# Patient Record
Sex: FEMALE | Race: BLACK OR AFRICAN AMERICAN | NOT HISPANIC OR LATINO
[De-identification: names, ages, dates, MRNs, and addresses within clinical notes are randomized per-mention and may not be internally consistent; named-entity substitution may affect disease eponyms.]

---

## 2018-03-09 ENCOUNTER — APPOINTMENT (OUTPATIENT)
Dept: INTERVENTIONAL RADIOLOGY/VASCULAR | Facility: HOSPITAL | Age: 48
End: 2018-03-09
Payer: COMMERCIAL

## 2018-03-09 DIAGNOSIS — D25.9 LEIOMYOMA OF UTERUS, UNSPECIFIED: ICD-10-CM

## 2018-03-09 DIAGNOSIS — I10 ESSENTIAL (PRIMARY) HYPERTENSION: ICD-10-CM

## 2018-03-09 DIAGNOSIS — Z78.9 OTHER SPECIFIED HEALTH STATUS: ICD-10-CM

## 2018-03-09 DIAGNOSIS — Z86.79 PERSONAL HISTORY OF OTHER DISEASES OF THE CIRCULATORY SYSTEM: ICD-10-CM

## 2018-03-09 DIAGNOSIS — J45.40 MODERATE PERSISTENT ASTHMA, UNCOMPLICATED: ICD-10-CM

## 2018-03-09 PROCEDURE — 99205 OFFICE O/P NEW HI 60 MIN: CPT

## 2018-03-09 RX ORDER — ALBUTEROL SULFATE 90 UG/1
108 (90 BASE) AEROSOL, METERED RESPIRATORY (INHALATION)
Refills: 0 | Status: ACTIVE | COMMUNITY

## 2018-03-09 RX ORDER — METFORMIN HYDROCHLORIDE 1000 MG/1
1000 TABLET, EXTENDED RELEASE ORAL
Refills: 0 | Status: ACTIVE | COMMUNITY

## 2018-03-09 RX ORDER — GABAPENTIN 300 MG/1
300 CAPSULE ORAL 3 TIMES DAILY
Refills: 0 | Status: ACTIVE | COMMUNITY

## 2018-03-23 ENCOUNTER — OUTPATIENT (OUTPATIENT)
Dept: OUTPATIENT SERVICES | Facility: HOSPITAL | Age: 48
LOS: 1 days | End: 2018-03-23
Payer: MEDICAID

## 2018-03-23 DIAGNOSIS — Z01.818 ENCOUNTER FOR OTHER PREPROCEDURAL EXAMINATION: ICD-10-CM

## 2018-03-23 DIAGNOSIS — D25.9 LEIOMYOMA OF UTERUS, UNSPECIFIED: ICD-10-CM

## 2018-03-23 LAB
ALBUMIN SERPL ELPH-MCNC: 4.1 G/DL — SIGNIFICANT CHANGE UP (ref 3.3–5)
ALP SERPL-CCNC: 76 U/L — SIGNIFICANT CHANGE UP (ref 40–120)
ALT FLD-CCNC: 18 U/L — SIGNIFICANT CHANGE UP (ref 10–45)
ANION GAP SERPL CALC-SCNC: 14 MMOL/L — SIGNIFICANT CHANGE UP (ref 5–17)
APTT BLD: 27.6 SEC — SIGNIFICANT CHANGE UP (ref 27.5–37.4)
AST SERPL-CCNC: 17 U/L — SIGNIFICANT CHANGE UP (ref 10–40)
BILIRUB SERPL-MCNC: 0.2 MG/DL — SIGNIFICANT CHANGE UP (ref 0.2–1.2)
BUN SERPL-MCNC: 11 MG/DL — SIGNIFICANT CHANGE UP (ref 7–23)
CALCIUM SERPL-MCNC: 9.9 MG/DL — SIGNIFICANT CHANGE UP (ref 8.4–10.5)
CHLORIDE SERPL-SCNC: 97 MMOL/L — SIGNIFICANT CHANGE UP (ref 96–108)
CO2 SERPL-SCNC: 26 MMOL/L — SIGNIFICANT CHANGE UP (ref 22–31)
CREAT SERPL-MCNC: 0.87 MG/DL — SIGNIFICANT CHANGE UP (ref 0.5–1.3)
GLUCOSE SERPL-MCNC: 138 MG/DL — HIGH (ref 70–99)
HCG SERPL-ACNC: 1.6 MIU/ML — SIGNIFICANT CHANGE UP
HCT VFR BLD CALC: 39.6 % — SIGNIFICANT CHANGE UP (ref 34.5–45)
HGB BLD-MCNC: 12.5 G/DL — SIGNIFICANT CHANGE UP (ref 11.5–15.5)
INR BLD: 0.95 — SIGNIFICANT CHANGE UP (ref 0.88–1.16)
MCHC RBC-ENTMCNC: 29.1 PG — SIGNIFICANT CHANGE UP (ref 27–34)
MCHC RBC-ENTMCNC: 31.6 G/DL — LOW (ref 32–36)
MCV RBC AUTO: 92.1 FL — SIGNIFICANT CHANGE UP (ref 80–100)
PLATELET # BLD AUTO: 280 K/UL — SIGNIFICANT CHANGE UP (ref 150–400)
POTASSIUM SERPL-MCNC: 4.6 MMOL/L — SIGNIFICANT CHANGE UP (ref 3.5–5.3)
POTASSIUM SERPL-SCNC: 4.6 MMOL/L — SIGNIFICANT CHANGE UP (ref 3.5–5.3)
PROT SERPL-MCNC: 7.6 G/DL — SIGNIFICANT CHANGE UP (ref 6–8.3)
PROTHROM AB SERPL-ACNC: 10.5 SEC — SIGNIFICANT CHANGE UP (ref 9.8–12.7)
RBC # BLD: 4.3 M/UL — SIGNIFICANT CHANGE UP (ref 3.8–5.2)
RBC # FLD: 13.2 % — SIGNIFICANT CHANGE UP (ref 10.3–16.9)
SODIUM SERPL-SCNC: 137 MMOL/L — SIGNIFICANT CHANGE UP (ref 135–145)
WBC # BLD: 8.7 K/UL — SIGNIFICANT CHANGE UP (ref 3.8–10.5)
WBC # FLD AUTO: 8.7 K/UL — SIGNIFICANT CHANGE UP (ref 3.8–10.5)

## 2018-03-23 PROCEDURE — 85027 COMPLETE CBC AUTOMATED: CPT

## 2018-03-23 PROCEDURE — 84702 CHORIONIC GONADOTROPIN TEST: CPT

## 2018-03-23 PROCEDURE — 85610 PROTHROMBIN TIME: CPT

## 2018-03-23 PROCEDURE — 85730 THROMBOPLASTIN TIME PARTIAL: CPT

## 2018-03-23 PROCEDURE — 80053 COMPREHEN METABOLIC PANEL: CPT

## 2018-03-23 PROCEDURE — 36415 COLL VENOUS BLD VENIPUNCTURE: CPT

## 2018-03-26 ENCOUNTER — APPOINTMENT (OUTPATIENT)
Dept: INTERVENTIONAL RADIOLOGY/VASCULAR | Facility: HOSPITAL | Age: 48
End: 2018-03-26

## 2018-04-16 ENCOUNTER — FORM ENCOUNTER (OUTPATIENT)
Age: 48
End: 2018-04-16

## 2018-04-17 ENCOUNTER — APPOINTMENT (OUTPATIENT)
Dept: MRI IMAGING | Facility: HOSPITAL | Age: 48
End: 2018-04-17

## 2018-04-17 ENCOUNTER — OUTPATIENT (OUTPATIENT)
Dept: OUTPATIENT SERVICES | Facility: HOSPITAL | Age: 48
LOS: 1 days | End: 2018-04-17
Payer: MEDICAID

## 2018-04-17 PROCEDURE — 72197 MRI PELVIS W/O & W/DYE: CPT | Mod: 26

## 2018-04-17 PROCEDURE — 72197 MRI PELVIS W/O & W/DYE: CPT

## 2018-04-17 PROCEDURE — A9585: CPT

## 2018-04-19 ENCOUNTER — OTHER (OUTPATIENT)
Age: 48
End: 2018-04-19

## 2018-07-26 PROBLEM — Z78.9 ALCOHOL USE: Status: INACTIVE | Noted: 2018-03-09

## 2018-09-05 ENCOUNTER — OUTPATIENT (OUTPATIENT)
Dept: OUTPATIENT SERVICES | Facility: HOSPITAL | Age: 48
LOS: 1 days | End: 2018-09-05
Payer: MEDICAID

## 2018-09-05 DIAGNOSIS — R07.9 CHEST PAIN, UNSPECIFIED: ICD-10-CM

## 2018-09-05 DIAGNOSIS — R06.09 OTHER FORMS OF DYSPNEA: ICD-10-CM

## 2018-09-05 PROCEDURE — A9505: CPT

## 2018-09-05 PROCEDURE — 78452 HT MUSCLE IMAGE SPECT MULT: CPT

## 2018-09-05 PROCEDURE — A9500: CPT

## 2018-09-05 PROCEDURE — 93017 CV STRESS TEST TRACING ONLY: CPT

## 2019-06-10 ENCOUNTER — APPOINTMENT (OUTPATIENT)
Dept: SURGERY | Facility: CLINIC | Age: 49
End: 2019-06-10
Payer: COMMERCIAL

## 2019-06-10 VITALS
DIASTOLIC BLOOD PRESSURE: 85 MMHG | SYSTOLIC BLOOD PRESSURE: 131 MMHG | HEART RATE: 88 BPM | WEIGHT: 293 LBS | HEIGHT: 70 IN | OXYGEN SATURATION: 96 % | TEMPERATURE: 97.1 F | BODY MASS INDEX: 41.95 KG/M2

## 2019-06-10 DIAGNOSIS — K42.9 UMBILICAL HERNIA W/OUT OBSTRUCTION OR GANGRENE: ICD-10-CM

## 2019-06-10 PROCEDURE — 99203 OFFICE O/P NEW LOW 30 MIN: CPT

## 2019-06-13 PROBLEM — K42.9 UMBILICAL HERNIA: Status: ACTIVE | Noted: 2019-06-13

## 2019-09-30 ENCOUNTER — APPOINTMENT (OUTPATIENT)
Dept: ORTHOPEDIC SURGERY | Facility: CLINIC | Age: 49
End: 2019-09-30

## 2019-10-11 ENCOUNTER — APPOINTMENT (OUTPATIENT)
Dept: PULMONOLOGY | Facility: CLINIC | Age: 49
End: 2019-10-11

## 2019-10-24 ENCOUNTER — APPOINTMENT (OUTPATIENT)
Dept: PULMONOLOGY | Facility: CLINIC | Age: 49
End: 2019-10-24
Payer: COMMERCIAL

## 2019-10-24 VITALS
SYSTOLIC BLOOD PRESSURE: 132 MMHG | BODY MASS INDEX: 43.4 KG/M2 | HEART RATE: 105 BPM | HEIGHT: 69 IN | OXYGEN SATURATION: 96 % | WEIGHT: 293 LBS | DIASTOLIC BLOOD PRESSURE: 90 MMHG

## 2019-10-24 DIAGNOSIS — Z82.49 FAMILY HISTORY OF ISCHEMIC HEART DISEASE AND OTHER DISEASES OF THE CIRCULATORY SYSTEM: ICD-10-CM

## 2019-10-24 PROCEDURE — 99204 OFFICE O/P NEW MOD 45 MIN: CPT | Mod: 25

## 2019-10-24 PROCEDURE — 94010 BREATHING CAPACITY TEST: CPT

## 2019-10-24 RX ORDER — PHENAZOPYRIDINE HYDROCHLORIDE 200 MG/1
200 TABLET ORAL
Refills: 0 | Status: ACTIVE | COMMUNITY

## 2019-10-24 RX ORDER — OMEPRAZOLE 40 MG/1
40 CAPSULE, DELAYED RELEASE ORAL
Refills: 0 | Status: ACTIVE | COMMUNITY

## 2019-10-24 RX ORDER — MONTELUKAST 10 MG/1
10 TABLET, FILM COATED ORAL
Refills: 0 | Status: ACTIVE | COMMUNITY

## 2019-10-24 RX ORDER — FUROSEMIDE 40 MG/1
40 TABLET ORAL
Refills: 0 | Status: ACTIVE | COMMUNITY

## 2019-10-24 RX ORDER — LOSARTAN POTASSIUM 100 MG/1
100 TABLET, FILM COATED ORAL
Refills: 0 | Status: ACTIVE | COMMUNITY

## 2019-10-24 RX ORDER — LISINOPRIL 40 MG/1
40 TABLET ORAL DAILY
Refills: 0 | Status: DISCONTINUED | COMMUNITY
End: 2019-10-24

## 2019-10-24 NOTE — HISTORY OF PRESENT ILLNESS
[FreeTextEntry1] : 49F morbidly obese with reported h/o persistent asthma, here for evaluation of sob. Pt denies wheezing and uses her ventolin daily when she gets sob after walking. Her LA has been ongoing for years and feels its getting worse. She has no chronic cough, no seasonal allergies, no sinus issues. Has no pets. Her weight has been up and down.\par Pt also reports DAVID dx many years ago and pt cannot tolerate CPAP in past but does snore nd c/o excessive daytime sleepiness.

## 2019-10-24 NOTE — PHYSICAL EXAM
[General Appearance - Well Developed] : well developed [FreeTextEntry1] : obese [General Appearance - Well Nourished] : well nourished [Normal Oropharynx] : normal oropharynx [Normal Conjunctiva] : the conjunctiva exhibited no abnormalities [Neck Appearance] : the appearance of the neck was normal [Heart Sounds] : normal S1 and S2 [Heart Rate And Rhythm] : heart rate and rhythm were normal [Murmurs] : no murmurs present [Arterial Pulses Normal] : the arterial pulses were normal [Respiration, Rhythm And Depth] : normal respiratory rhythm and effort [Exaggerated Use Of Accessory Muscles For Inspiration] : no accessory muscle use [Auscultation Breath Sounds / Voice Sounds] : lungs were clear to auscultation bilaterally [Abdomen Soft] : soft [Bowel Sounds] : normal bowel sounds [Abdomen Tenderness] : non-tender [] : no hepato-splenomegaly [Abnormal Walk] : normal gait [Skin Color & Pigmentation] : normal skin color and pigmentation [Non-Pitting] : non-pitting [Oriented To Time, Place, And Person] : oriented to person, place, and time [Impaired Insight] : insight and judgment were intact

## 2019-10-24 NOTE — CONSULT LETTER
[Dear  ___] : Dear  [unfilled], [Consult Letter:] : I had the pleasure of evaluating your patient, [unfilled]. [Please see my note below.] : Please see my note below. [Consult Closing:] : Thank you very much for allowing me to participate in the care of this patient.  If you have any questions, please do not hesitate to contact me. [Sincerely,] : Sincerely, [DrGloria  ___] : Dr. MIXON [FreeTextEntry3] : [facility] Bean MD, [facility]

## 2019-10-24 NOTE — ASSESSMENT
[FreeTextEntry1] : SOB/LA - I suspect morbid obesity and chest wall restriction are the main reasons for this pt's sob. I am  not clear on dx of asthma and would like her to complete a full PFT as well as methacholine challenge to assess further as the worst thing for this pt are any kind of steroids. I also offered referral for bariatric evaluation.\par \par DAVID - old study, very high pretest probability. cardiac disease, will send for split night sleep study and titration in center. \par \par RTC after above studies completed.\par

## 2019-10-29 ENCOUNTER — APPOINTMENT (OUTPATIENT)
Dept: ORTHOPEDIC SURGERY | Facility: CLINIC | Age: 49
End: 2019-10-29
Payer: COMMERCIAL

## 2019-10-29 VITALS — WEIGHT: 293 LBS | HEIGHT: 70 IN | BODY MASS INDEX: 41.95 KG/M2

## 2019-10-29 DIAGNOSIS — G89.29 PAIN IN LEFT KNEE: ICD-10-CM

## 2019-10-29 DIAGNOSIS — M25.562 PAIN IN LEFT KNEE: ICD-10-CM

## 2019-10-29 DIAGNOSIS — M21.70 UNEQUAL LIMB LENGTH (ACQUIRED), UNSPECIFIED SITE: ICD-10-CM

## 2019-10-29 DIAGNOSIS — M25.561 PAIN IN RIGHT KNEE: ICD-10-CM

## 2019-10-29 DIAGNOSIS — G89.29 PAIN IN RIGHT KNEE: ICD-10-CM

## 2019-10-29 PROCEDURE — 73564 X-RAY EXAM KNEE 4 OR MORE: CPT | Mod: RT

## 2019-10-29 PROCEDURE — 20610 DRAIN/INJ JOINT/BURSA W/O US: CPT | Mod: LT

## 2019-10-29 PROCEDURE — 73522 X-RAY EXAM HIPS BI 3-4 VIEWS: CPT

## 2019-10-29 PROCEDURE — 99204 OFFICE O/P NEW MOD 45 MIN: CPT | Mod: 25

## 2019-10-29 NOTE — PROCEDURE
[Injection] : Injection [Left] : of the left [Knee Joint] : knee joint [Osteoarthritis] : Osteoarthritis [Patient] : patient [Risk] : risk [Benefits] : benefits [Alternatives] : alternatives [Bleeding] : bleeding [Infection] : infection [Allergic Reaction] : allergic reaction [Verbal Consent Obtained] : verbal consent was obtained prior to the procedure [Alcohol] : Alcohol [Betadine] : Betadine [Ethyl Chloride Spray] : ethyl chloride spray was used as a topical anesthetic [Lateral] : lateral [Anterior] : anterior [22] : a 22-gauge [1% Lidocaine___(mL)] : [unfilled] mL of 1% Lidocaine [Methylpred. 40mg/mL___(mL)] : [unfilled] mL of 40mg/mL methylprednisolone [Bandage Applied] : a bandage [Tolerated Well] : The patient tolerated the procedure well [None] : none [No Strenuous Activity___day(s)] : avoid strenuous activity for [unfilled] day(s) [de-identified] : partial pain relief after injection ambulating. [de-identified] : she should check her fingerstick glucose in the next several days and call if it is above 200.

## 2019-10-29 NOTE — ASSESSMENT
[FreeTextEntry1] : 49-year-old with underlying obesity and  diabetes with severe osteoarthritis bilateral knees lateral rIGHT and medial compartment LEFT.\par there is mild to moderate bilateral hip osteoarthritis as well.\par We discussed the treatment options. Most important would be to lose weight to get some pressure off of these joints. Also at her weight many surgeons would not do joint replacement surgery which ultimately I think is what she will need. Right now she will try some physical therapy and continue to work on weight loss which is a struggle for her. I did a corticosteroid injection in her LEFT knee today which hopefully will give her some relief and to try the RIGHT knee on next visit.It may or may not give long-term relief. She can take either meloxicam or ibuprofen but not both on the same day. She could take some Tylenol as well for pain relief. I suggested using a walker but she prefers to just use the cane.\par He didn't ice as needed. Elevate for the ankle edema.Followup in 6-8 weeks.

## 2019-10-29 NOTE — HISTORY OF PRESENT ILLNESS
[de-identified] : Ms. Gardner is a 49-year-old woman who comes in for evaluation of her knees.The left hurts more than the right. \par It started a few mos ago when she slipped on some water but didn't fall.\par Pain is 7/10 constantly Except when sitting and lying down..\par No pain sitting or lying but gets pain Getting up from a chair and walking and bending.\par She also gets pain in the groin worse on the LEFT than RIGHT. She states that before this slip she really didn't have much knee pain at all. She had seen someone for her hips in the past and there are x-rays from 2014 at [facility].\par She hasn't had any treatment. She is taking meloxicam and ibuprofen. She walks with a cane. She also has chronic back pain. She knows that she needs to lose weight. She is about 5 feet 10 inches and weighs 355 pounds. She states that she is working with a nutritionist. She has diabetes.\par She can only walk a couple blocks with pain and difficulty.\par she states that she was at a leg length discrepancy since she was young.

## 2019-10-29 NOTE — PHYSICAL EXAM
[Cane] : ambulates with cane [LE] : Sensory: Intact in bilateral lower extremities [Normal RLE] : Right Lower Extremity: No scars, rashes, lesions, ulcers, skin intact [Normal LLE] : Left Lower Extremity: No scars, rashes, lesions, ulcers, skin intact [Normal Touch] : sensation intact for touch [Obese] : obese [Normal] : No respiratory distress and lungs were clear to auscultation bilaterally [Poor Appearance] : well-appearing [Acute Distress] : not in acute distress [Abl Affect] : with normal affect [Poor Coordination] : normal coordination [Disorientation] : oriented x 3 [de-identified] : Knees:\par antalgic gait. significant limp do to both pain but also the leg length discrepancy with the RIGHT leg shorter.\par possible small effusion- difficult to appreciate with the obesity.\par - erythema, edema, warmth.\par Tender bilateral knees on the joint lines and around the patella..\par ROM: 0 degrees extension to 115 degrees flexion with pain on full flexion LEFT greater than RIGHT.- crepitus\par - Sergo.\par 1A Lachman.  - Pivot shift. - posterior drawer. Normal rotational, varus/valgus laxity.\par Intact extensor mechanism.\par NVI distally.\par  [de-identified] : bilateral lower leg edema.Brawny edema. [de-identified] : \par X-rays bilateral knees weightbearing 4 views taken today show osteoarthritis tricompartmental most severe/ Bone-on-bone lateral compartment on RIGHT and medial compartment on the LEFT side\par \par X-rays of bilateral hips AP and lateral views today show mild to moderate joint space narrowing and productive changes consistent with mild to moderate osteoarthritis. On the RIGHT side medial calcar region there is a sclerotic area well-defined that was similar on x-rays takenin 2014 without change.

## 2019-10-30 ENCOUNTER — APPOINTMENT (OUTPATIENT)
Dept: PULMONOLOGY | Facility: CLINIC | Age: 49
End: 2019-10-30

## 2019-11-12 ENCOUNTER — OTHER (OUTPATIENT)
Age: 49
End: 2019-11-12

## 2019-11-18 ENCOUNTER — APPOINTMENT (OUTPATIENT)
Dept: SLEEP CENTER | Facility: HOSPITAL | Age: 49
End: 2019-11-18

## 2019-11-20 ENCOUNTER — APPOINTMENT (OUTPATIENT)
Dept: PULMONOLOGY | Facility: CLINIC | Age: 49
End: 2019-11-20

## 2019-11-25 ENCOUNTER — APPOINTMENT (OUTPATIENT)
Dept: SLEEP CENTER | Facility: HOSPITAL | Age: 49
End: 2019-11-25

## 2019-11-25 ENCOUNTER — OUTPATIENT (OUTPATIENT)
Dept: OUTPATIENT SERVICES | Facility: HOSPITAL | Age: 49
LOS: 1 days | End: 2019-11-25
Payer: COMMERCIAL

## 2019-11-25 DIAGNOSIS — G47.33 OBSTRUCTIVE SLEEP APNEA (ADULT) (PEDIATRIC): ICD-10-CM

## 2019-11-25 PROCEDURE — 95811 POLYSOM 6/>YRS CPAP 4/> PARM: CPT | Mod: 26

## 2019-11-25 PROCEDURE — 95811 POLYSOM 6/>YRS CPAP 4/> PARM: CPT

## 2019-11-29 ENCOUNTER — OTHER (OUTPATIENT)
Age: 49
End: 2019-11-29

## 2019-12-11 ENCOUNTER — APPOINTMENT (OUTPATIENT)
Dept: ORTHOPEDIC SURGERY | Facility: CLINIC | Age: 49
End: 2019-12-11
Payer: COMMERCIAL

## 2019-12-11 VITALS — WEIGHT: 293 LBS | HEIGHT: 70 IN | BODY MASS INDEX: 41.95 KG/M2

## 2019-12-11 DIAGNOSIS — M25.551 PAIN IN RIGHT HIP: ICD-10-CM

## 2019-12-11 PROCEDURE — 99215 OFFICE O/P EST HI 40 MIN: CPT

## 2019-12-11 PROCEDURE — 72100 X-RAY EXAM L-S SPINE 2/3 VWS: CPT

## 2019-12-11 PROCEDURE — 72170 X-RAY EXAM OF PELVIS: CPT

## 2019-12-11 NOTE — REASON FOR VISIT
[Follow-Up Visit] : a follow-up visit for [Knee Osteoarthritis] : knee osteoarthritis [FreeTextEntry2] : Hip arthritis, back

## 2019-12-11 NOTE — HISTORY OF PRESENT ILLNESS
[de-identified] : 39-year-old with bilateral knee and hip osteoarthritis worse in the knees.\par Her LEFT knee was injected with steroids last visit about 6 weeks ago which was helpful but is wearing off. \par She was referred to physical therapy. She didn't go - she lost the script \par She is taking meloxicam and Tylenol daily\par \par She c/o severe LBP across her back and right hip / groin hurts. There is pain across her whole front of the pelvis. She has had MRI of the pelvis in the past which showed fibroids and she's had fibroid procedures. The MRI report from April 2018 is in the record and was reviewed.\par she has regular bowel movements usually twice a day.\par no bowel or bladder issues.\par she denies having numbness down her legs.The pain is all across the back and is quite severe and makes it difficult for her to move. The RIGHT hip hurts a lot more than her LEFT hip/groin area. Medication does not help very much. She walks with a cane. She doesn't want to use a walker.\par she is working with a nutritionist to lose weight.

## 2019-12-11 NOTE — DISCUSSION/SUMMARY
[de-identified] : 49-year-old woman with diabetes and obesity with severe bilateral knee osteoarthritis worse lateral RIGHT and medial LEFT and mild to moderate bilateral hip osteoarthritis.\par She also has significant multilevel lumbar degenerative disc disease and severe back pain. The RIGHT groin and hip pain is also quite severe, out of proportion to the degree of arthritis seen. She has had a gynecologic workup. She does have fibroids but her pain doesn't seem to necessarily be related to the fibroids. I recommended getting an MRI of the lumbar spine and RIGHT hip to assess these areas further.\par She should continue with weight loss and working with the nutritionist.\par She was again referred to physical therapy.\par Meloxicam and Tylenol as needed for pain. I did give her a prednisone taper to take for one week. While doing so she should stop the meloxicam. Hopefully this will give her some relief of her pain in the meantime.\par If she is having severe pain walking I recommended using a walker.\par I will call her with MRI results and see how she is feeling.\par followup in 6-8 weeks otherwise.

## 2019-12-11 NOTE — PHYSICAL EXAM
[de-identified] : EDEMA bilateral [de-identified] : Knees\par very antalgic gait.\par No apparent effusion.\par - erythema, edema, warmth.\par Tender medial and lateral joint lines\par ROM: 0 degrees extension to 889=459 degrees flexion. pain on flexion\par  Normal rotational, varus/valgus laxity.\par Intact extensor mechanism.pain and difficulty lifting her legs due to have pain particularly on the RIGHT but bilateral\par NVI distally.\par \par Bilateral hips and low back \par Skin: Clean/dry and intact\par Inspection: No obvious deformity, no swelling.\par Pulses: 2+ DP/PT pulses\par hip range of motion is more painful RIGHT than LEFT with about 90° flexion and 0° internal rotation and 20-25° external rotation.\par Tenderness: No tenderness over greater trochanter/glut medius insertion. No groin pain. No ttp over the ASIS/Illiac crest.\par Strength: 4/5 ADD/ABD/Q/H 5/5TA/GS/EHL. She has generalized weakness hips and thighs apparently from thepain\par Neuro: Sensation intact to light touch throughout, DTR normal\par Additional tests: Negative impingement test\par ++ SLR left at 30 degr vs 60 on Right  On the LEFT there is pain radiating down the lEFT lower extremity with straight leg raise\par severe Brawny edema bilateral lower legs and ankles\par  [de-identified] : \par x-rays LS spine AP Lat  show multilevel degenerative disc disease and spondylosis/facet arthrosis.\par \par AP pelvis shows mild hip space narrowing. No fractures seen.

## 2019-12-18 ENCOUNTER — OTHER (OUTPATIENT)
Age: 49
End: 2019-12-18

## 2020-01-17 PROBLEM — M47.816 LUMBAR SPONDYLOSIS: Status: ACTIVE | Noted: 2019-12-11

## 2020-01-21 ENCOUNTER — APPOINTMENT (OUTPATIENT)
Dept: ORTHOPEDIC SURGERY | Facility: CLINIC | Age: 50
End: 2020-01-21

## 2020-01-21 DIAGNOSIS — M47.816 SPONDYLOSIS W/OUT MYELOPATHY OR RADICULOPATHY, LUMBAR REGION: ICD-10-CM

## 2020-01-30 PROBLEM — M16.0 PRIMARY OSTEOARTHRITIS OF BOTH HIPS: Status: ACTIVE | Noted: 2019-10-29

## 2020-01-31 ENCOUNTER — APPOINTMENT (OUTPATIENT)
Dept: ORTHOPEDIC SURGERY | Facility: CLINIC | Age: 50
End: 2020-01-31
Payer: COMMERCIAL

## 2020-01-31 DIAGNOSIS — M16.0 BILATERAL PRIMARY OSTEOARTHRITIS OF HIP: ICD-10-CM

## 2020-01-31 PROCEDURE — 99214 OFFICE O/P EST MOD 30 MIN: CPT

## 2020-01-31 NOTE — PHYSICAL EXAM
[de-identified] : Low back and hips\par Obese.\par Mildly tender in the lumbar spine. Limited lumbar range of motion.\par Straight leg raise causes back and hip pain.\par She is very heavy in general in her lower extremities with shiny skin and what appears to be some nonpitting edema in addition to the obesity. Sensation is grossly intact.\par Hip range of motion is limited to about 90° flexion and 0° internal rotation with groin pain.\par \par Knees:\par Nonantalgic gait.\par + effusion.\par - erythema, edema, warmth.\par Tender Medial and lateral joint lines.\par ROM: 0  degrees extension to 115 degrees flexion. - crepitus\par - Sergo.\par 1A Lachman.  - Pivot shift. - posterior drawer. Normal rotational, varus/valgus laxity.\par Intact extensor mechanism.\par NVI distally.\par  [de-identified] : No skin lesions [de-identified] : Edema lower legs [de-identified] : \par No new x-rays

## 2020-01-31 NOTE — DISCUSSION/SUMMARY
[de-identified] : 49-year-old with diabetes and obesity with severe bilateral knee osteoarthritis, Moderate hip arthritis and lumbar spondylosis with significant pain in all these areas.\par The most important thing she could do right now is to lose a significant amount of weight to get stress off of these joints and it would also probably help with the swelling in her legs. It would help her mobility and likely the pain. Also right now at her weight she is not a good surgical candidate and is at increased risk.\par She understands this and is going to see a bariatric surgeon. She has tried to lose weight.\par She can take Tylenol and meloxicam as needed for pain and the gabapentin. I ordered hyaluronic acid injections and these may give her some temporary pain relief.\par She was again referred to physical therapy She should get the MRI of the lumbar spine to evaluate further because she does seem to have some radicular pain in the RIGHT lower extremity. I will call her with those results.\par Followup in 2 months.

## 2020-01-31 NOTE — HISTORY OF PRESENT ILLNESS
[de-identified] : Followup bilateral Low back, hips and knees which are feeling the same. She is in a lot of chronic pain in all these areas most severe right knee, posterolateral.\par She had been referred for more physical therapy and encouraged to try to lose weight to get stress off of her joints. SHe hasn't had time to go to PT and her mother passed away.\par She is taking Meloxicam and gabapentin. \par I had ordered an MRI of her lumbar spine which she hasn't done yet. The back pain radiates into the right LE. Her leg feels tight. No definite numbness.\par She has pain in her groins. She doesn't want to walk with a walker.\par She walks with a cane.\par The oral prednisone she was given after last visit in Dec only helped a little temporarily\par Her right leg feels tight like a band.\par Another general surgeon had referred her for bariatric surgery consultation. She has tried weight loss without success.\par She has difficulty walking one to 2 blocks. She does not want to consider a walker.

## 2020-04-02 ENCOUNTER — APPOINTMENT (OUTPATIENT)
Dept: ORTHOPEDIC SURGERY | Facility: CLINIC | Age: 50
End: 2020-04-02

## 2020-09-09 ENCOUNTER — APPOINTMENT (OUTPATIENT)
Dept: ORTHOPEDIC SURGERY | Facility: CLINIC | Age: 50
End: 2020-09-09
Payer: MEDICAID

## 2020-09-16 ENCOUNTER — APPOINTMENT (OUTPATIENT)
Dept: ORTHOPEDIC SURGERY | Facility: CLINIC | Age: 50
End: 2020-09-16
Payer: MEDICAID

## 2020-09-16 VITALS
OXYGEN SATURATION: 96 % | HEART RATE: 97 BPM | WEIGHT: 293 LBS | SYSTOLIC BLOOD PRESSURE: 160 MMHG | BODY MASS INDEX: 41.95 KG/M2 | DIASTOLIC BLOOD PRESSURE: 90 MMHG | HEIGHT: 70 IN

## 2020-09-16 DIAGNOSIS — M54.16 RADICULOPATHY, LUMBAR REGION: ICD-10-CM

## 2020-09-16 DIAGNOSIS — G89.29 PAIN IN RIGHT HIP: ICD-10-CM

## 2020-09-16 DIAGNOSIS — M25.551 PAIN IN RIGHT HIP: ICD-10-CM

## 2020-09-16 PROCEDURE — 20610 DRAIN/INJ JOINT/BURSA W/O US: CPT | Mod: LT

## 2020-09-16 PROCEDURE — 99204 OFFICE O/P NEW MOD 45 MIN: CPT | Mod: 25

## 2020-09-16 RX ORDER — MELOXICAM 15 MG/1
TABLET ORAL
Refills: 0 | Status: DISCONTINUED | COMMUNITY
End: 2020-09-16

## 2020-09-16 NOTE — HISTORY OF PRESENT ILLNESS
[de-identified] : The patient is a 50 year old woman with history of DM, Obesity presenting with bilateral knee pain.\par \par She has a known history of lumbar degenerative disc disease, bilateral hip osteoarthritis, and bilateral knee osteoarthritis.  She had previous care with Dr. Stiles.  She complains of ongoing, chronic bilateral knee pain.  She endorses mild swelling and stiffness, worse at the end of the day with activity.  She has been attempting to lose weight, but has been limited with exercise options.  She also continues to have ongoing right hip and low back pain with radiation. She has yet to obtain MRI L-spine and hip as previously ordered.  She has tolerated and responded to HA injections in the past.\par \par Pain is rated 10/10, described as sharp/achy, improved with rest, worse with walking. [10] : a current pain level of 10/10

## 2020-09-16 NOTE — DISCUSSION/SUMMARY
[Medication Risks Reviewed] : Medication risks reviewed [de-identified] : The patient is a 50 year old woman with history of obesity, DM, lumbar radiculopathy, Hip OA presenting with bilateral knee pain secondary to knee osteoarthritis.\par \par I discussed the treatment of degenerative arthritis with the patient at length today. I described the spectrum of treatment from nonoperative modalities to total joint arthroplasty. Noninvasive and nonoperative treatment modalities include weight reduction, activity modification with low impact exercise, PRN use of acetaminophen or anti-inflammatory medication if tolerated, natural supplements such as glucosamine/chondroitin, and physical therapy. Further treatments can include corticosteroid injection, hyaluronic acid injections, and orthobiologic such as PRP. Definitive treatment can certainly include total joint arthroplasty, but patient would require surgical consultation to discuss that option further. The risks and benefits of each treatment options was discussed and all questions were answered.\par \par We again discussed the importance of low impact activity, exercise and weight loss.\par \par After informed consent, and explanation of risks, benefits, alternatives, adverse effects of injection, which includes but is not limited to infection, bleeding, allergic reaction, swelling, soft tissue weakening/tendon rupture, injection site complication, fat atrophy, skin depigmentation, failure to improve symptoms, the patient would like to proceed with the procedure - BILATERAL KNEE GEL-ONE INJECTIONS. See procedure note above. Patient tolerated the procedure well. The patient was provided with postinjection instructions.\par \par Patient given refill of Meloxicam to be use as needed.\par \par Appropriate use of medication was reviewed with the patient in detail. Risks, benefits, and adverse effects medication were discussed.\par \par MRI of lumbar spine and bilateral hips ordered today.\par \par ------------------------------------------------------------------------------------------------------------------\par Patient appreciates and agrees with current plan.\par \par This note was generated using a mixture of manual typing and dragon medical dictation software.  A reasonable effort has been made for proofreading its contents, but typos may still remain.  If there are any questions or points of clarification needed please notify my office.\par \par >45 minutes of time was spent on total encounter.  >50% of the visit was spent on counseling/coordination of care and medical-decision making for this patient.\par \par \par

## 2020-09-16 NOTE — REVIEW OF SYSTEMS
[Joint Pain] : joint pain [Abdominal Pain] : abdominal pain [Sleep Disturbances] : ~T sleep disturbances [Hot Flashes] : hot flashes

## 2020-09-16 NOTE — PHYSICAL EXAM
[de-identified] : General: Well-nourished, well-developed, alert, and in no acute distres, OBESE.\par Head: Normocephalic.\par Eyes: Pupils equal round reactive to light and accommodation, extraocular muscles intact, normal sclera.\par Nose: No nasal discharge.\par Cardiac: Regular rate. Extremities are warm and well perfused. Distal pulses are symmetric bilaterally.\par Respiratory: No labored breathing.\par Extremities: Sensation is intact distally bilaterally.  Distal pulses are symmetric bilaterally\par Lymphatic: No regional lymphadenopathy, no lymphedema\par Neurologic: No focal deficits\par Skin: Normal skin color, texture, and turgor\par Psychiatric: Normal affect\par MSK: as noted above/below\par \par \par \par RIGHT KNEE:\par \par Inspection: no bruising, erythema\par Joint Effusion:TRACE\par ROM: Knee Flexion 120 DEGREES WITH PAIN , Knee Extension 0\par Palpation:JOINT LINE PAIN, PATELLAR FACET TENDERNESS,  MFC/LFC, Medial/Lateral Tibial Plateau\par Leg Length Discrepancy:no\par Patella: no apprehension\par Distal Pulses: normal\par Lower Extremity Strength:, 5-/5 \par Lower Extremity Reflexes:normal, 2+\par Lower Extremity Sensation: normal\par \par Special Tests:\par Cristian:EQUIVOCAL \par Anterior Drawer:Negative\par Anterior Lachman:Negative\par Posterior Drawer:Negative \par Varus/Valgus:Negative, no instability\par \par RIGHT KNEE:\par \par Inspection: no bruising, erythema\par Joint Effusion:TRACE\par ROM: Knee Flexion 120 DEGREES WITH PAIN , Knee Extension 0\par Palpation:JOINT LINE PAIN, PATELLAR FACET TENDERNESS,  MFC/LFC, Medial/Lateral Tibial Plateau\par Leg Length Discrepancy:no\par Patella: no apprehension\par Distal Pulses: normal\par Lower Extremity Strength:, 5-/5 \par Lower Extremity Reflexes:normal, 2+\par Lower Extremity Sensation: normal\par \par Special Tests:\par [facility]:EQUIVOCAL \par Anterior Drawer:Negative\par Anterior Lachman:Negative\par Posterior Drawer:Negative \par Varus/Valgus:Negative, no instability\par \par

## 2020-09-16 NOTE — PROCEDURE
[de-identified] : Injection: Right knee joint.\par Indication: Osteoarthritis.\par \par A discussion was had with the patient regarding this procedure and all questions were answered. All risks, benefits and alternatives were discussed. These included but were not limited to bleeding, infection, allergic reaction and reaccumulation of fluid. A timeout was done to ensure correct side and pt agreed to the procedure.  Alcohol was used to clean the skin, and betadine was used to sterilize and prep the area in the lateral joint line aspect of the knee. Ethyl chloride spray was then used as a topical anesthetic. A 22-gauge needle was used to inject 2cc of 1% lidocaine without epinephrine and 1cc of 3cc of Gel-One into the knee. A sterile bandage was then applied. The patient tolerated the procedure well.\par ______________________________\par Injection: LEft knee joint.\par Indication: Osteoarthritis.\par \par A discussion was had with the patient regarding this procedure and all questions were answered. All risks, benefits and alternatives were discussed. These included but were not limited to bleeding, infection, allergic reaction and reaccumulation of fluid. A timeout was done to ensure correct side and pt agreed to the procedure.  Alcohol was used to clean the skin, and betadine was used to sterilize and prep the area in the lateral joint line aspect of the knee. Ethyl chloride spray was then used as a topical anesthetic. A 22-gauge needle was used to inject 2cc of 1% lidocaine without epinephrine and 1cc of 3cc of Gel-One into the knee. A sterile bandage was then applied. The patient tolerated the procedure well.\par \par LOT: [id number] (Both)\par Exp: 2020-12-01

## 2021-04-05 ENCOUNTER — APPOINTMENT (OUTPATIENT)
Dept: PULMONOLOGY | Facility: CLINIC | Age: 51
End: 2021-04-05
Payer: MEDICAID

## 2021-04-05 VITALS
HEIGHT: 70 IN | HEART RATE: 94 BPM | WEIGHT: 293 LBS | OXYGEN SATURATION: 96 % | TEMPERATURE: 97.2 F | DIASTOLIC BLOOD PRESSURE: 86 MMHG | BODY MASS INDEX: 41.95 KG/M2 | SYSTOLIC BLOOD PRESSURE: 149 MMHG

## 2021-04-05 DIAGNOSIS — R06.02 SHORTNESS OF BREATH: ICD-10-CM

## 2021-04-05 DIAGNOSIS — J45.909 UNSPECIFIED ASTHMA, UNCOMPLICATED: ICD-10-CM

## 2021-04-05 DIAGNOSIS — G47.33 OBSTRUCTIVE SLEEP APNEA (ADULT) (PEDIATRIC): ICD-10-CM

## 2021-04-05 PROCEDURE — 99204 OFFICE O/P NEW MOD 45 MIN: CPT

## 2021-04-05 PROCEDURE — 99072 ADDL SUPL MATRL&STAF TM PHE: CPT

## 2021-04-05 RX ORDER — PREDNISONE 10 MG/1
10 TABLET ORAL
Qty: 20 | Refills: 0 | Status: DISCONTINUED | COMMUNITY
Start: 2019-12-11 | End: 2021-04-05

## 2021-04-05 RX ORDER — ROSUVASTATIN CALCIUM 40 MG/1
40 TABLET, FILM COATED ORAL
Qty: 30 | Refills: 0 | Status: ACTIVE | COMMUNITY
Start: 2020-09-25

## 2021-04-05 RX ORDER — ACETAMINOPHEN AND CODEINE 300; 30 MG/1; MG/1
300-30 TABLET ORAL
Qty: 30 | Refills: 0 | Status: ACTIVE | COMMUNITY
Start: 2021-03-01

## 2021-04-05 RX ORDER — HYALURONATE SODIUM 20 MG/2 ML
20 SYRINGE (ML) INTRAARTICULAR
Qty: 1 | Refills: 0 | Status: DISCONTINUED | OUTPATIENT
Start: 2020-01-31 | End: 2021-04-05

## 2021-04-05 RX ORDER — HYDRALAZINE HYDROCHLORIDE 25 MG/1
25 TABLET ORAL
Qty: 90 | Refills: 0 | Status: ACTIVE | COMMUNITY
Start: 2021-03-01

## 2021-04-05 RX ORDER — FLUTICASONE PROPIONATE AND SALMETEROL 250; 50 UG/1; UG/1
250-50 POWDER RESPIRATORY (INHALATION)
Qty: 60 | Refills: 0 | Status: ACTIVE | COMMUNITY
Start: 2021-02-23

## 2021-04-05 RX ORDER — AMLODIPINE BESYLATE 10 MG/1
10 TABLET ORAL
Refills: 0 | Status: DISCONTINUED | COMMUNITY
End: 2021-04-05

## 2021-04-06 PROBLEM — J45.909 ASTHMA: Status: ACTIVE | Noted: 2021-04-06

## 2021-04-06 PROBLEM — R06.02 SOB (SHORTNESS OF BREATH) ON EXERTION: Status: ACTIVE | Noted: 2019-10-24

## 2021-04-06 PROBLEM — G47.33 OBSTRUCTIVE SLEEP APNEA SYNDROME: Status: ACTIVE | Noted: 2019-10-24

## 2021-04-06 NOTE — HISTORY OF PRESENT ILLNESS
[Never] : never [TextBox_4] : 49yo with hx of HTN, DM, asthma, HLD, HF?, s/p PPM referred for management of DAVID?. Patient is unclear. She was hospitalized at [facility] for about 1 month. She is unclear what work up was done there. She ended up going there because in Jan she started to fall, and hit her head. Unclear if she was sleepy and if this was at night or during the day. Had a syncopal episode with LOC on Feb 5 and this is what led to her hospitalization. Hx of asthma that seems to be well controlled on ICS/LABA which she has been using for over a decade with no step down therapy. No pets, carpets or occupational/environmental exposures. \par \par Did follow with Dr. Bean in 2019, and had a sleep study which showed severe DAVID but she never got the machine. She did end up getting the machine in the hospital during recent hospitalization. DME is [employer].

## 2021-04-06 NOTE — PHYSICAL EXAM
[No Acute Distress] : no acute distress [Normal Oropharynx] : normal oropharynx [Normal Appearance] : normal appearance [No Neck Mass] : no neck mass [Normal Rate/Rhythm] : normal rate/rhythm [No Resp Distress] : no resp distress [Clear to Auscultation Bilaterally] : clear to auscultation bilaterally [No Abnormalities] : no abnormalities [Benign] : benign [Normal Gait] : normal gait [No Clubbing] : no clubbing [No Edema] : no edema [Normal Color/ Pigmentation] : normal color/ pigmentation [No Focal Deficits] : no focal deficits [Oriented x3] : oriented x3 [Normal Affect] : normal affect

## 2021-04-06 NOTE — ASSESSMENT
[FreeTextEntry1] : 49yo with hx of HTN, DM, asthma, HLD, HF?, s/p PPM referred for management of DAVID? and asthma? Recent almost one month long hospitalization after a syncopal episode. Unclear what workup was done. Started the process to obtain records. Prior PSG/CPAP titration in 2019 was reviewed, showed severe DAVID with resolution of events at 10 cm H2O but she never started treatment. She should continue to use the PAP machine given to her after her hospitalization ([facility]?); I will contact Beverly for more info. Should also continue with ICS/LABA for now. Will decide on further management decisions once [facility] records are reviewed.

## 2021-05-05 RX ORDER — HYALURONATE SOD, CROSS-LINKED 30 MG/3 ML
30 SYRINGE (ML) INTRAARTICULAR
Qty: 2 | Refills: 0 | Status: ACTIVE | COMMUNITY
Start: 2021-05-05

## 2021-07-19 ENCOUNTER — NON-APPOINTMENT (OUTPATIENT)
Age: 51
End: 2021-07-19

## 2021-12-08 ENCOUNTER — APPOINTMENT (OUTPATIENT)
Dept: ORTHOPEDIC SURGERY | Facility: CLINIC | Age: 51
End: 2021-12-08
Payer: MEDICAID

## 2021-12-09 ENCOUNTER — APPOINTMENT (OUTPATIENT)
Dept: ORTHOPEDIC SURGERY | Facility: CLINIC | Age: 51
End: 2021-12-09
Payer: MEDICAID

## 2021-12-13 ENCOUNTER — OUTPATIENT (OUTPATIENT)
Dept: OUTPATIENT SERVICES | Facility: HOSPITAL | Age: 51
LOS: 1 days | End: 2021-12-13
Payer: COMMERCIAL

## 2021-12-13 ENCOUNTER — RESULT REVIEW (OUTPATIENT)
Age: 51
End: 2021-12-13

## 2021-12-13 ENCOUNTER — APPOINTMENT (OUTPATIENT)
Dept: ORTHOPEDIC SURGERY | Facility: CLINIC | Age: 51
End: 2021-12-13
Payer: MEDICAID

## 2021-12-13 DIAGNOSIS — E66.9 OBESITY, UNSPECIFIED: ICD-10-CM

## 2021-12-13 DIAGNOSIS — Z86.39 PERSONAL HISTORY OF OTHER ENDOCRINE, NUTRITIONAL AND METABOLIC DISEASE: ICD-10-CM

## 2021-12-13 DIAGNOSIS — M70.61 TROCHANTERIC BURSITIS, RIGHT HIP: ICD-10-CM

## 2021-12-13 PROCEDURE — 20611 DRAIN/INJ JOINT/BURSA W/US: CPT | Mod: RT

## 2021-12-13 PROCEDURE — 73521 X-RAY EXAM HIPS BI 2 VIEWS: CPT

## 2021-12-13 PROCEDURE — 99212 OFFICE O/P EST SF 10 MIN: CPT | Mod: 25

## 2021-12-13 PROCEDURE — 73521 X-RAY EXAM HIPS BI 2 VIEWS: CPT | Mod: 26

## 2021-12-13 RX ORDER — METHOCARBAMOL 500 MG/1
500 TABLET, FILM COATED ORAL
Qty: 90 | Refills: 0 | Status: ACTIVE | COMMUNITY
Start: 2021-12-13 | End: 1900-01-01

## 2021-12-13 NOTE — DISCUSSION/SUMMARY
[de-identified] : The patient is a 50 year old woman with history of obesity, DM, lumbar radiculopathy, Hip OA presenting with chronic right-sided pain.  Pain likely multifactorial related to greater trochanteric pain, lumbar DDD with radiculopathy.  This is in the context of morbid obesity.\par \par Imaging/Diagnostics/Medical Records were interpreted and/or reviewed and results were reviewed with the patient in detail.  All questions were answered appropriately.\par \par The patient was counseled on the natural progression of the problem(s) today and potential complications of diagnoses.  \par \par After informed consent, and explanation of risks, benefits, alternatives, adverse effects of injection, which includes but is not limited to infection, bleeding, allergic reaction, swelling, soft tissue weakening/tendon rupture, neurovascular injury, injection site complication, fat atrophy, skin depigmentation, failure to improve symptoms, the patient would like to proceed with the procedure - RIGHT GREATER TROCH BURSA ULTRASOUND-GUIDED CORTICOSTEROID INJECTION. See procedure note above. Patient tolerated the procedure well. The patient was provided with postinjection instructions.\par \par MRI of the lumbar spine ordered today.\par \par Patient was prescribed a short course of Methocarbamol PRN.Appropriate use of medication was reviewed with the patient in detail. Risks, benefits, and adverse effects medication were discussed.\par \par Patient given referral to Endocrinology for diabetes management and to discuss potential weight loss options.\par \par We again discussed the importance of weight loss, and its impact on arthritis.\par \par Follow-up after MRI.\par ------------------------------------------------------------------------------------------------------------------\par Patient appreciates and agrees with current plan.\par \par This note was generated using a mixture of manual typing and dragon medical dictation software.  A reasonable effort has been made for proofreading its contents, but typos may still remain.  If there are any questions or points of clarification needed please notify my office.\par \par >15 minutes of time was spent on total encounter.  >50% of the visit was spent on counseling/coordination of care and medical-decision making for this patient.\par \par \par

## 2021-12-13 NOTE — PROCEDURE
[de-identified] : ULTRASOUND-GUIDED TROCHANTERIC BURSA INJECTION\par Indication for Ultrasound: To visualize trochanteric bursa and avoid damage to tendons and surrounding neurovascular structures\par \par Injection: RIGHT Hip.\par Indication: Trochanteric Bursitis.\par \par A discussion was had with the patient regarding this procedure and all questions were answered. All risks, benefits and alternatives were discussed. These included but were not limited to bleeding, infection, injection site reaction/complication and allergic reaction. A timeout was done to ensure correct side and pt agreed to the procedure.  Alcohol was used to clean the skin, and betadine was used to sterilize and prep the area in the lateral aspect of the hip. The trochanteric bursa was visualized utilizing the Sonosite, linear transducer. The joint was visualized in the short axis and an in-plane approach was used for the injection. Ultrasound guidance was utilized to ensure accuracy of the injection.  A 22-gauge needle was used to inject 2cc of 1% lidocaine and 1cc of 40mg/ml methylprednisolone into the greater trochanteric bursa using a needling technique. A sterile bandage was then applied. The patient tolerated the procedure well and there were no complications.\par

## 2021-12-13 NOTE — HISTORY OF PRESENT ILLNESS
[de-identified] : The patient is a 50 year old woman with history of DM, morbid Obesity presenting for hip pain.\par \par She has a known history of lumbar degenerative disc disease, bilateral hip osteoarthritis, and bilateral knee osteoarthritis.  She was previously seen about 3 months for bilateral knee OA and had bilateral Gel-One HA injections.  She had previous care with Dr. Goncalves. \par \par She was instructed to obtain an MRI L-spine and hips which she has not obtained on several occasions.\par \par She now complains of progressive, atraumatic right lateral hip pain, with continued right-sided sciatica.  She has now transitioned to use of walker from cane.  She states that her diet has been stable, despite continued increased in weight.  She states A1C has been around 6 range.  Diabetes is currently managed by her primary care physician.

## 2021-12-13 NOTE — PHYSICAL EXAM
[de-identified] : General: Well-nourished, well-developed, alert, and in no acute distres, OBESE.\par Head: Normocephalic.\par Eyes: Pupils equal round reactive to light and accommodation, extraocular muscles intact, normal sclera.\par Nose: No nasal discharge.\par Cardiac: Regular rate. Extremities are warm and well perfused. Distal pulses are symmetric bilaterally.\par Respiratory: No labored breathing.\par Extremities: Sensation is intact distally bilaterally.  Distal pulses are symmetric bilaterally\par Lymphatic: No regional lymphadenopathy, no lymphedema\par Neurologic: No focal deficits\par Skin: Normal skin color, texture, and turgor\par Psychiatric: Normal affect\par MSK: as noted above/below\par \par \par \par RIGHT KNEE:\par \par Inspection: no bruising, erythema\par Joint Effusion:TRACE\par ROM: Knee Flexion 120 DEGREES WITH PAIN , Knee Extension 0\par Palpation:JOINT LINE PAIN, PATELLAR FACET TENDERNESS,  MFC/LFC, Medial/Lateral Tibial Plateau\par Leg Length Discrepancy:no\par Patella: no apprehension\par Distal Pulses: normal\par Lower Extremity Strength:, 5-/5 \par Lower Extremity Reflexes:normal, 2+\par Lower Extremity Sensation: normal\par \par Special Tests:\par Cristian:EQUIVOCAL \par Anterior Drawer:Negative\par Anterior Lachman:Negative\par Posterior Drawer:Negative \par Varus/Valgus:Negative, no instability\par \par RIGHT KNEE:\par \par Inspection: no bruising, erythema\par Joint Effusion:TRACE\par ROM: Knee Flexion 120 DEGREES WITH PAIN , Knee Extension 0\par Palpation:JOINT LINE PAIN, PATELLAR FACET TENDERNESS,  MFC/LFC, Medial/Lateral Tibial Plateau\par Leg Length Discrepancy:no\par Patella: no apprehension\par Distal Pulses: normal\par Lower Extremity Strength:, 5-/5 \par Lower Extremity Reflexes:normal, 2+\par Lower Extremity Sensation: normal\par \par Special Tests:\par [facility]:EQUIVOCAL \par Anterior Drawer:Negative\par Anterior Lachman:Negative\par Posterior Drawer:Negative \par Varus/Valgus:Negative, no instability\par \par LEFT HIP:\par \par Inspection: no bruising, erythema, rash, deformity \par Palpation: No Greater Trochanter pain , ITB pain , Hip Flexor pain  , Piriformis pain , Proximal Hamstring Pain , Groin pain \par ROM: normal Internal Rotation , External Rotation\par Strength: 5/5 Hip Flexion, Hip Extension, Hip Abduction, Hip Adduction\par \par Distal Pulses: normal\par Lower Extremity Sensation: normal \par Patellar/Achilles Reflex: normal\par \par Special Tests:\par Stinchfield: NEGATIVE \par Log Roll: NEGATIVE\par FABERE: NEGATIVE\par FADIR: NEGATIVE\par \par \par RIGHT HIP:\par \par Inspection: no bruising, erythema, rash, deformity \par Palpation: PAIN AT GREATER TROCH, No, ITB pain , Hip Flexor pain  , Piriformis pain , Proximal Hamstring Pain , Groin pain \par ROM: normal Internal Rotation , External Rotation\par Strength: 5/5 Hip Flexion, Hip Extension, Hip Abduction, Hip Adduction\par \par Distal Pulses: normal\par Lower Extremity Sensation: normal \par Patellar/Achilles Reflex: normal\par \par Special Tests:\par Stinchfield: NEGATIVE \par Log Roll: NEGATIVE\par FABERE: POSITIVE\par FADIR: POSITIVE\par \par Low Back:\par \par Inspection:\par ·	Alignment: No scoliosis or deformity. \par ·	Spasm not noted. \par ·	No scars\par \par Strength: \par ·	Hip / Leg Flexion, Extension  5/5\par ·	Foot Eversion/ Inversion:   5/5\par ·	Calf:   5/5\par ·	EHL:  5/5\par \par NEURO  \par ·	Sensation:   Intact throughout the lower extremity\par ·	DTR's:  Symmetric throughout the lower extremity.  \par ·	Upper Motor Neuron:\par                 Benoit Test: NEGATIVE\par                 Romberg Test: NEGATIVE\par                 Clonus: NEGATIVE\par \par Other tests: \par ·	Straight leg raise test:   MILDLY POSITIVE ON RIGHT\par Vascular:  \par ·	No cyanosis, clubbing, edema.  \par ·	Palpable dorsalis pedis.\par \par \par \par \par \par  [de-identified] : Xray Pelvis and Bilateral Hip  - Multiple views were reviewed with the patient in detail.  There is no acute fracture or dislocation.  There is no joint effusion.  Mild hip joint space narrowing.  We will await formal radiology reading.\par

## 2022-03-01 ENCOUNTER — APPOINTMENT (OUTPATIENT)
Dept: ORTHOPEDIC SURGERY | Facility: CLINIC | Age: 52
End: 2022-03-01
Payer: MEDICAID

## 2022-03-01 VITALS
BODY MASS INDEX: 41.95 KG/M2 | SYSTOLIC BLOOD PRESSURE: 177 MMHG | HEIGHT: 70 IN | HEART RATE: 93 BPM | OXYGEN SATURATION: 95 % | WEIGHT: 293 LBS | TEMPERATURE: 98.3 F | DIASTOLIC BLOOD PRESSURE: 95 MMHG

## 2022-03-01 DIAGNOSIS — M17.0 BILATERAL PRIMARY OSTEOARTHRITIS OF KNEE: ICD-10-CM

## 2022-03-01 DIAGNOSIS — G89.29 LUMBAGO WITH SCIATICA, RIGHT SIDE: ICD-10-CM

## 2022-03-01 DIAGNOSIS — M54.41 LUMBAGO WITH SCIATICA, RIGHT SIDE: ICD-10-CM

## 2022-03-01 PROCEDURE — 99212 OFFICE O/P EST SF 10 MIN: CPT

## 2022-03-01 RX ORDER — MELOXICAM 15 MG/1
15 TABLET ORAL
Qty: 30 | Refills: 1 | Status: DISCONTINUED | COMMUNITY
Start: 2020-09-16 | End: 2022-03-01

## 2022-03-01 RX ORDER — NAPROXEN 500 MG/1
500 TABLET ORAL
Qty: 60 | Refills: 1 | Status: ACTIVE | COMMUNITY
Start: 2022-03-01 | End: 1900-01-01

## 2022-03-01 RX ORDER — TRAMADOL HYDROCHLORIDE AND ACETAMINOPHEN 37.5; 325 MG/1; MG/1
37.5-325 TABLET, FILM COATED ORAL
Qty: 15 | Refills: 0 | Status: ACTIVE | COMMUNITY
Start: 2022-03-01 | End: 1900-01-01

## 2022-03-01 RX ORDER — HYALURONATE SOD, CROSS-LINKED 30 MG/3 ML
30 SYRINGE (ML) INTRAARTICULAR
Qty: 2 | Refills: 0 | Status: ACTIVE | COMMUNITY
Start: 2022-03-01

## 2022-03-01 NOTE — PHYSICAL EXAM
[de-identified] : General: Well-nourished, well-developed, alert, and in no acute distres, OBESE.\par Head: Normocephalic.\par Eyes: Pupils equal round reactive to light and accommodation, extraocular muscles intact, normal sclera.\par Nose: No nasal discharge.\par Cardiac: Regular rate. Extremities are warm and well perfused. Distal pulses are symmetric bilaterally.\par Respiratory: No labored breathing.\par Extremities: Sensation is intact distally bilaterally.  Distal pulses are symmetric bilaterally\par Lymphatic: No regional lymphadenopathy, no lymphedema\par Neurologic: No focal deficits\par Skin: Normal skin color, texture, and turgor\par Psychiatric: Normal affect\par MSK: as noted above/below\par \par \par \par RIGHT KNEE:\par \par Inspection: no bruising, erythema\par Joint Effusion:TRACE\par ROM: Knee Flexion 120 DEGREES WITH PAIN , Knee Extension 0\par Palpation:JOINT LINE PAIN, PATELLAR FACET TENDERNESS,  MFC/LFC, Medial/Lateral Tibial Plateau\par Leg Length Discrepancy:no\par Patella: no apprehension\par Distal Pulses: normal\par Lower Extremity Strength:, 5-/5 \par Lower Extremity Reflexes:normal, 2+\par Lower Extremity Sensation: normal\par \par Special Tests:\par Cristian:EQUIVOCAL \par Anterior Drawer:Negative\par Anterior Lachman:Negative\par Posterior Drawer:Negative \par Varus/Valgus:Negative, no instability\par \par RIGHT KNEE:\par \par Inspection: no bruising, erythema\par Joint Effusion:TRACE\par ROM: Knee Flexion 120 DEGREES WITH PAIN , Knee Extension 0\par Palpation:JOINT LINE PAIN, PATELLAR FACET TENDERNESS,  MFC/LFC, Medial/Lateral Tibial Plateau\par Leg Length Discrepancy:no\par Patella: no apprehension\par Distal Pulses: normal\par Lower Extremity Strength:, 5-/5 \par Lower Extremity Reflexes:normal, 2+\par Lower Extremity Sensation: normal\par \par Special Tests:\par [facility]:EQUIVOCAL \par Anterior Drawer:Negative\par Anterior Lachman:Negative\par Posterior Drawer:Negative \par Varus/Valgus:Negative, no instability\par \par LEFT HIP:\par \par Inspection: no bruising, erythema, rash, deformity \par Palpation: No Greater Trochanter pain , ITB pain , Hip Flexor pain  , Piriformis pain , Proximal Hamstring Pain , Groin pain \par ROM: normal Internal Rotation , External Rotation\par Strength: 5/5 Hip Flexion, Hip Extension, Hip Abduction, Hip Adduction\par \par Distal Pulses: normal\par Lower Extremity Sensation: normal \par Patellar/Achilles Reflex: normal\par \par Special Tests:\par Stinchfield: NEGATIVE \par Log Roll: NEGATIVE\par FABERE: NEGATIVE\par FADIR: NEGATIVE\par \par \par RIGHT HIP:\par \par Inspection: no bruising, erythema, rash, deformity \par Palpation: PAIN AT GREATER TROCH, No, ITB pain , Hip Flexor pain  , Piriformis pain , Proximal Hamstring Pain , Groin pain \par ROM: normal Internal Rotation , External Rotation\par Strength: 5/5 Hip Flexion, Hip Extension, Hip Abduction, Hip Adduction\par \par Distal Pulses: normal\par Lower Extremity Sensation: normal \par Patellar/Achilles Reflex: normal\par \par Special Tests:\par Stinchfield: NEGATIVE \par Log Roll: NEGATIVE\par FABERE: POSITIVE\par FADIR: POSITIVE\par \par Low Back:\par \par Inspection:\par ·	Alignment: No scoliosis or deformity. \par ·	Spasm not noted. \par ·	No scars\par \par Strength: \par ·	Hip / Leg Flexion, Extension  5/5\par ·	Foot Eversion/ Inversion:   5/5\par ·	Calf:   5/5\par ·	EHL:  5/5\par \par NEURO  \par ·	Sensation:   Intact throughout the lower extremity\par ·	DTR's:  Symmetric throughout the lower extremity.  \par ·	Upper Motor [place]:\par                 Benoit Test: NEGATIVE\par                 Romberg Test: NEGATIVE\par                 Clonus: NEGATIVE\par \par Other tests: \par ·	Straight leg raise test:   MILDLY POSITIVE ON RIGHT\par Vascular:  \par ·	No cyanosis, clubbing, edema.  \par ·	Palpable dorsalis pedis.\par \par \par \par \par \par

## 2022-03-01 NOTE — HISTORY OF PRESENT ILLNESS
[de-identified] : The patient is a 50 year old woman with history of DM, morbid Obesity presenting for back and knee pain.\par \par She has a known history of lumbar degenerative disc disease, bilateral hip osteoarthritis, and bilateral knee osteoarthritis.  She was previously seen about 3 months for right-sided hip/low back pain and had a troch bursa injection.  Injection provided minimal relief.\par \par MRI L-spine ordered, but she is unable to do MRIs because of implantable pacemaker.\par \par Prior to that, she was treated for bilateral knee OA and had bilateral Gel-One HA injections.  She had previous care with Dr. Goncalves. \par \par She continues to have low back and knee pain.

## 2022-03-01 NOTE — DISCUSSION/SUMMARY
[Medication Risks Reviewed] : Medication risks reviewed [de-identified] : The patient is a 50 year old woman with history of obesity, DM, lumbar radiculopathy, Hip OA presenting with chronic right-sided low back pain pain.  Pain likely multifactorial related to facet arthropathy/lumbar DDD with radiculopathy.  This is in the context of morbid obesity.\par \par Imaging/Diagnostics/Medical Records were interpreted and/or reviewed and results were reviewed with the patient in detail.  All questions were answered appropriately.\par \par The patient was counseled on the natural progression of the problem(s) today and potential complications of diagnoses.  \par \par CT Myelogram lumbar spine ordered today.\par \par Patient interested in epidural spine injections.\par \par She was given referral to pain management.\par \par Trial HA injections for knee OA.  Bilateral GEL-ONE injections ordered today.\par \par Patient was prescribed a short course of Naproxen.Appropriate use of medication was reviewed with the patient in detail. Risks, benefits, and adverse effects medication were discussed.\par \par Patient was prescribed a short course of Ultracet for breakthrough pain.  Prior to prescribing, history of the patient's scheduled medication use was reviewed utilizing the I:STOP [place]  aware program.  Appropriate use of medication was discussed with the patient in detail.  The risks, benefits, adverse effects, alternative options were discussed.  The patient expressed understanding.\par \par \par Follow-up after CT\par ------------------------------------------------------------------------------------------------------------------\par Patient appreciates and agrees with current plan.\par \par This note was generated using a mixture of manual typing and dragon medical dictation software.  A reasonable effort has been made for proofreading its contents, but typos may still remain.  If there are any questions or points of clarification needed please notify my office.\par \par >15 minutes of time was spent on total encounter.  >50% of the visit was spent on counseling/coordination of care and medical-decision making for this patient.\par \par \par

## 2022-03-14 ENCOUNTER — APPOINTMENT (OUTPATIENT)
Dept: PHYSICAL MEDICINE AND REHAB | Facility: CLINIC | Age: 52
End: 2022-03-14

## 2022-04-11 ENCOUNTER — APPOINTMENT (OUTPATIENT)
Dept: PULMONOLOGY | Facility: CLINIC | Age: 52
End: 2022-04-11